# Patient Record
Sex: MALE | Race: OTHER | NOT HISPANIC OR LATINO | URBAN - METROPOLITAN AREA
[De-identification: names, ages, dates, MRNs, and addresses within clinical notes are randomized per-mention and may not be internally consistent; named-entity substitution may affect disease eponyms.]

---

## 2021-10-31 ENCOUNTER — EMERGENCY (EMERGENCY)
Facility: HOSPITAL | Age: 71
LOS: 1 days | Discharge: HOME | End: 2021-10-31
Attending: EMERGENCY MEDICINE
Payer: COMMERCIAL

## 2021-10-31 VITALS
TEMPERATURE: 98 F | HEART RATE: 94 BPM | HEIGHT: 68 IN | RESPIRATION RATE: 18 BRPM | DIASTOLIC BLOOD PRESSURE: 79 MMHG | SYSTOLIC BLOOD PRESSURE: 123 MMHG | OXYGEN SATURATION: 96 % | WEIGHT: 164.91 LBS

## 2021-10-31 PROCEDURE — 99284 EMERGENCY DEPT VISIT MOD MDM: CPT

## 2021-10-31 PROCEDURE — 99283 EMERGENCY DEPT VISIT LOW MDM: CPT

## 2021-10-31 RX ORDER — AMOXICILLIN 250 MG/5ML
1 SUSPENSION, RECONSTITUTED, ORAL (ML) ORAL
Qty: 10 | Refills: 0
Start: 2021-10-31 | End: 2021-11-04

## 2021-10-31 RX ORDER — OFLOXACIN OTIC SOLUTION 3 MG/ML
10 SOLUTION/ DROPS AURICULAR (OTIC)
Qty: 15 | Refills: 0
Start: 2021-10-31 | End: 2021-11-02

## 2021-10-31 NOTE — ED PROVIDER NOTE - OBJECTIVE STATEMENT
72 y/o man, no significant PMH, c/o right ear hearing decreased with mild pain since he states that he inserted a Q-tip too far in while cleaning ear canal.  No discharge/bleeding/tinnitus/fever/headache/dizziness.

## 2021-10-31 NOTE — ED ADULT NURSE NOTE - NSIMPLEMENTINTERV_GEN_ALL_ED
Implemented All Universal Safety Interventions:  [place] to call system. Call bell, personal items and telephone within reach. Instruct patient to call for assistance. Room bathroom lighting operational. Non-slip footwear when patient is off stretcher. Physically safe environment: no spills, clutter or unnecessary equipment. Stretcher in lowest position, wheels locked, appropriate side rails in place.

## 2021-10-31 NOTE — ED PROVIDER NOTE - NSFOLLOWUPINSTRUCTIONS_ED_ALL_ED_FT
RUPTURED EARDRUM - [facility](R) Instructions(ER/ED)           Ruptured Eardrum    WHAT YOU NEED TO KNOW:    A ruptured eardrum is a tear or hole in your eardrum.     Ear Anatomy         DISCHARGE INSTRUCTIONS:    Medicines:   •Antibiotic ear drops may be needed to treat or prevent an infection caused by bacteria.       •Take your medicine as directed. Contact your healthcare provider if you think your medicine is not helping or if you have side effects. Tell him or her if you are allergic to any medicine. Keep a list of the medicines, vitamins, and herbs you take. Include the amounts, and when and why you take them. Bring the list or the pill bottles to follow-up visits. Carry your medicine list with you in case of an emergency.      Follow up with your healthcare provider as directed: Write down your questions so you remember to ask them during your visits.    Self-care:   •Always keep your ear dry. Do not let your ear get wet, such as when bathing or swimming. Water may cause your damaged eardrum to heal more slowly and increase your risk for infection.       •Do not put anything in your ear. Never put objects such as cotton swabs in your ear. Pointed objects may damage or worsen the damage to your eardrum.       •Try not to blow your nose. The increase in pressure may cause further damage to your eardrum.       Contact your healthcare provider if:   •You have a fever.      •Your hearing loss gets worse.      •You feel increased dizziness, or you are vomiting.      •You have worsening ear pain or a new buzzing sound in your ear.       •Your symptoms do not improve, even after you take your medicine.      •You have questions or concerns about your condition or care.      Return to the emergency department if:   •You are bleeding from your ear.      •You cannot move or feel areas of your face.         © Copyright [employer] 2021           back to top                          © Copyright [employer] 2021

## 2021-10-31 NOTE — ED PROVIDER NOTE - PATIENT PORTAL LINK FT
You can access the FollowMyHealth Patient Portal offered by [facility] by registering at the following website: [email or web address]. By joining [facility]’s FollowMyHealth portal, you will also be able to view your health information using other applications (apps) compatible with our system.

## 2021-10-31 NOTE — ED PROVIDER NOTE - ENMT, MLM
Right ear TM rupture, scant blood in canal, no active bleeding or other discharge, hearing decreased in right ear compared to left;  external ear normal; Airway patent, Nasal mucosa clear. Mouth with normal mucosa.

## 2021-10-31 NOTE — ED PROVIDER NOTE - CLINICAL SUMMARY MEDICAL DECISION MAKING FREE TEXT BOX
72 y/o man, no significant PMH, c/o right ear hearing decreased with mild pain since he states that he inserted a Q-tip too far in while cleaning ear canal; exam reveals suspected TM rupture, no sign of infection.  D/w ENT Dr. Pollack and he advised to start Floxin Otic for duration of 3 days.  Pt informed and advised for ENT f/u and return precautions.

## 2023-11-07 PROBLEM — Z00.00 ENCOUNTER FOR PREVENTIVE HEALTH EXAMINATION: Status: ACTIVE | Noted: 2023-11-07
